# Patient Record
Sex: FEMALE | Employment: FULL TIME | URBAN - METROPOLITAN AREA
[De-identification: names, ages, dates, MRNs, and addresses within clinical notes are randomized per-mention and may not be internally consistent; named-entity substitution may affect disease eponyms.]

---

## 2023-01-10 ENCOUNTER — TRANSFERRED RECORDS (OUTPATIENT)
Dept: HEALTH INFORMATION MANAGEMENT | Facility: CLINIC | Age: 58
End: 2023-01-10

## 2023-04-07 ENCOUNTER — TRANSCRIBE ORDERS (OUTPATIENT)
Dept: OTHER | Age: 58
End: 2023-04-07

## 2023-04-07 ENCOUNTER — TELEPHONE (OUTPATIENT)
Dept: NEUROLOGY | Facility: CLINIC | Age: 58
End: 2023-04-07
Payer: COMMERCIAL

## 2023-04-07 DIAGNOSIS — G95.9 MYELOPATHY (H): Primary | ICD-10-CM

## 2023-04-07 DIAGNOSIS — G72.9 MYOPATHY: ICD-10-CM

## 2023-04-07 NOTE — TELEPHONE ENCOUNTER
[facility] Call Center    Phone Message    May a detailed message be left on voicemail: yes     Reason for Call: Other: Pt is calling to schedule an appt with neurology. She has a referral with a priority of 1-2 weeks. She is scheduled for 10/09 with Dr. Machuca. Pt needs a sooner appt. Please assist with scheduling.     Action Taken: Message routed to:  [facility] ([facility]): Neurology    Travel Screening: Not Applicable

## 2023-05-04 NOTE — TELEPHONE ENCOUNTER
Action 5/4/23 MV 2.13pm   Action Taken 1) imaging request faxed to [facility]  2) Records + imaging request faxed to [facility]     Action 5/31/23 MV 9.13am   Action Taken [facility] images resolved in PACS ; [facility] recs received and sent to scanning           RECORDS RECEIVED FROM: external   REASON FOR VISIT: Myelopathy (H) [G95.9]  Myopathy   Date of Appt: 6/26/23   NOTES (FOR ALL VISITS) STATUS DETAILS   OFFICE NOTE from referring provider Care Everywhere SEE INPATIENT NOTES   OFFICE NOTE from other specialist Received Dr Jane Watters @ [facility]:  1/10/23   DISCHARGE SUMMARY from hospital Care Everywhere [facility]:  3/31/23-4/3/23   MEDICATION LIST Care Everywhere    IMAGING  (FOR ALL VISITS)     MRI (HEAD, NECK, SPINE) PACS [facility]:  MRI Cervical Spine 4/2/23  MRI Head 4/2/23  MRI Lumbar Spine 3/31/23  MRI Thoracic Spine 3/31/23

## 2023-06-26 ENCOUNTER — PRE VISIT (OUTPATIENT)
Dept: NEUROLOGY | Facility: CLINIC | Age: 58
End: 2023-06-26